# Patient Record
Sex: FEMALE | Race: BLACK OR AFRICAN AMERICAN | NOT HISPANIC OR LATINO | URBAN - METROPOLITAN AREA
[De-identification: names, ages, dates, MRNs, and addresses within clinical notes are randomized per-mention and may not be internally consistent; named-entity substitution may affect disease eponyms.]

---

## 2024-09-05 DIAGNOSIS — M47.892 OTHER SPONDYLOSIS, CERVICAL REGION: Primary | ICD-10-CM

## 2024-09-19 ENCOUNTER — CLINICAL SUPPORT (OUTPATIENT)
Dept: REHABILITATION | Facility: HOSPITAL | Age: 64
End: 2024-09-19
Payer: OTHER GOVERNMENT

## 2024-09-19 DIAGNOSIS — M47.892 OTHER SPONDYLOSIS, CERVICAL REGION: Primary | ICD-10-CM

## 2024-09-19 PROCEDURE — 97810 ACUP 1/> WO ESTIM 1ST 15 MIN: CPT | Mod: PN

## 2024-09-19 NOTE — PROGRESS NOTES
Acupuncture Evaluation Note     Name: Isa Hernandez  Clinic Number: [id number]    Traditional Chinese Medicine (TCM) Diagnosis: Qi Stagnation, Blood Stasis, and Qi Deficiency  Medical Diagnosis: [id number] Other spondylosis, cervical region    Evaluation Date: 9/19/2024    Visit #/Visits authorized: 1/ 8    ([id number], issue date 9/4/2024; [facility] 180 days; first visit 9/19/2024; expiration 3/18/2025)    Precautions: Standard    Subjective     Chief Concern: pain, 4 bulging discs in neck and back; headaches, pain whole body.    Neck pain onset over 20 years ago, progressively worsened. Onset due to a fall in 1984 in . . Restricted range of motion left and right. Pain at back of neck and occiput, worse pain on the left . Pain can be achey or sharp at times, constant, at a 7-8 even with pain medication    Mid to lower back pain. Can no longer run sor stand/walk for any length of time. Back will easily go into spasm.  Pain radiates down legs also, worse on the left. Radiates down lateral thigh.    Headaches, history of migraines for about 10 years, daily with nausea, at frontal, parietal or occipital, tightness in her eyes. Sleeps when pain is worse.    Knee pain - worse on left knee at base of knee front    Medical necessity is demonstrated by the following IMPAIRMENTS: Medical Necessity: Decreased mobility limits day to day activities, social, and emergent situations and Decreased quality of life                 Aggravating Factors:  movement   Relieving Factors:  nothing    Pain Description:     Quality:  Aching and Sharp  Severity:  7-10  Frequency:  continuously    Previous Treatments Tried:   medication, injections, therapy    HEENT:  CPAP    Chest:  not assessed    Digestion:     Taste/Thirst/Appetite: not assessed     Sleep:   difficult to sleep, takes naps    Energy Levels:  fatigue    Psychological Symptoms:  no support from family/friends; has a therapist just once a month    Other Symptoms:   none  discussed    GYN Symptoms:   not assessed    Objective     Observation: moves carefully    Tongue:  not assessed    Pulse:  not assessed    Treatment     Treatment Principles:  Regulate Qi & Blood; stop pain    Needle Set 1 -     Acupuncture points used:     Bilateral:  GB20, GB12, BL25, Derik Ari, BL32, BL40  Left:  adria lateral neck +2, GB21, BL37, GB34  Right:    Centerline:      Needles In: 17  Needles Out: 17  Needles W/O STIM placed:   10:15  Needles W/O STIM removed:   10:35      Other Traditional Chinese Medicine Modalities -  none    Assessment     After treatment, patient felt  no change    Patient prognosis is Fair.     Patient will continue to benefit from acupuncture treatment to address the deficits listed in the problem list box on initial evaluation, provide patient family education and to maximize pt's level of independence in the home and community environment.     Patient's spiritual, cultural and educational needs considered and pt agreeable to plan of care and goals.       Anticipated barriers to treatment:   none    Plan     Recommend 1 /week for 6 sessions then re-assess.      Recommendations:  none at this time    Education:  Patient is aware of cumulative effect of acupuncture

## 2024-10-03 ENCOUNTER — CLINICAL SUPPORT (OUTPATIENT)
Dept: REHABILITATION | Facility: HOSPITAL | Age: 64
End: 2024-10-03
Payer: OTHER GOVERNMENT

## 2024-10-03 DIAGNOSIS — M47.892 OTHER SPONDYLOSIS, CERVICAL REGION: Primary | ICD-10-CM

## 2024-10-03 PROCEDURE — 97810 ACUP 1/> WO ESTIM 1ST 15 MIN: CPT | Mod: PN

## 2024-10-03 PROCEDURE — 97811 ACUP 1/> W/O ESTIM EA ADD 15: CPT | Mod: PN

## 2024-10-03 NOTE — PROGRESS NOTES
Acupuncture Treatment Note     [id number]    Traditional Chinese Medicine Diagnosis: Qi Stagnation, Blood Stasis, and Qi Deficiency    Date of Service: 10/3/2024     Medical Diagnosis: [id number] Other spondylosis, cervical region     Evaluation Date: 9/19/2024    Visit #/Visits authorized: 2/ 8    ([id number], issue date 9/4/2024; [facility] 180 days; first visit 9/19/2024; expiration 3/18/2025)     Precautions: Standard     Subjective     Chief Concern:   chronic pain, 4 bulging discs in neck and back; headaches, pain whole body.     Medical necessity is demonstrated by the following IMPAIRMENTS: Medical Necessity: Decreased mobility limits day to day activities, social, and emergent situations and Decreased quality of life           Aggravating Factors:  movement   Relieving Factors:  nothing     Pain Description:    Quality:  Aching and Sharp  Severity:  7-10  Frequency:  continuously      Response to Previous Treatment:    Isa had reduction in pain for about 4 days after treatment.     Other Condition/Symptoms:    - Knee pain  - uses CPAP  - difficulty sleeping; fatigue    Objective      New Findings:    none    Pulse:    not assessed  Tongue:    not assessed    Treatment      Treatment Principles:    Regulate Qi & Blood; stop pain     Needle Set 1 -     Acupuncture Points:    Lie on right side -   Bilateral:  GB20, BL10,  BL25,  BL32, lateral border sacrum +2, BL23  Left:  adria lateral neck +2, GB21, SI13, SI14, BL57, GB34  Right:    Centerline:  GV line +4    #NEEDLES IN:   25  #NEEDLES OUT:   25  NEEDLES W/O STIM  placed:    10:20  NEEDLES W/O STIM removed:    10:55    Other Traditional Chinese Medicine Modalities:   none      Assessment      Analysis of Treatment:    After treatment Isa had pain at her right hip from lying on her right side on a table with only slight padding.    Pt prognosis is Fair.     Patient will continue to benefit from acupuncture treatment to  address the deficits listed in the problem list box on initial evaluation, provide patient family education and to maximize pt's level of independence in the home and community environment.     Patient's spiritual, cultural and educational needs considered and pt agreeable to plan of care and goals.     Anticipated barriers to treatment:   none    Plan     Recommend    continue with care plan.     Education:  Patient is aware of cumulative effect of acupuncture

## 2024-10-17 ENCOUNTER — CLINICAL SUPPORT (OUTPATIENT)
Dept: REHABILITATION | Facility: HOSPITAL | Age: 64
End: 2024-10-17
Payer: OTHER GOVERNMENT

## 2024-10-17 DIAGNOSIS — M47.892 OTHER SPONDYLOSIS, CERVICAL REGION: Primary | ICD-10-CM

## 2024-10-17 PROCEDURE — 97811 ACUP 1/> W/O ESTIM EA ADD 15: CPT | Mod: PN

## 2024-10-17 PROCEDURE — 97810 ACUP 1/> WO ESTIM 1ST 15 MIN: CPT | Mod: PN

## 2024-11-14 ENCOUNTER — CLINICAL SUPPORT (OUTPATIENT)
Dept: REHABILITATION | Facility: HOSPITAL | Age: 64
End: 2024-11-14
Payer: OTHER GOVERNMENT

## 2024-11-14 DIAGNOSIS — M47.892 OTHER SPONDYLOSIS, CERVICAL REGION: Primary | ICD-10-CM

## 2024-11-14 PROCEDURE — 97811 ACUP 1/> W/O ESTIM EA ADD 15: CPT | Mod: PN

## 2024-11-14 PROCEDURE — 97810 ACUP 1/> WO ESTIM 1ST 15 MIN: CPT | Mod: PN

## 2024-11-14 NOTE — PROGRESS NOTES
Acupuncture Treatment Note     Name: Isa Hernandez  [facility] Number: [id number]    Traditional Chinese Medicine Diagnosis: Qi Stagnation, Blood Stasis, and Qi Deficiency    Date of Service: 11/14/2024     Medical Diagnosis: [id number] Other spondylosis, cervical region     Evaluation Date: 9/19/2024    Visit #/Visits authorized: 4 / 8    ([id number], issue date 9/4/2024; [facility] 180 days; first visit 9/19/2024; expiration 3/18/2025)     Precautions: Standard     Subjective     Chief Concern:   chronic pain, 4 bulging discs in neck and back; headaches, pain whole body.     Medical necessity is demonstrated by the following IMPAIRMENTS: Medical Necessity: Decreased mobility limits day to day activities, social, and emergent situations and Decreased quality of life           Aggravating Factors:  movement   Relieving Factors:  nothing     Pain Description:    Quality:  Aching and Sharp  Severity:  7-10  Frequency:  continuously       Response to Previous Treatment:   Isa has not had a treatment in about a month. Today she has severe hip pain (both hips), migraines.  Right occipital area radiating to whole head  - waiting for appointment with neurologist to get appropriate migraine medication. Now constant pain, with nausea.      Other Condition/Symptoms:    - Knee pain  - uses CPAP  - difficulty sleeping; fatigue      Objective      New Findings:    none    Pulse:    not assessed  Tongue:    not assessed    Treatment      Treatment Principles:     Regulate Qi & Blood; stop pain     Needle Set 1 -     Acupuncture Points:      Sitting  Bilateral:  Ear BFA protocol +5; GB20, GB21, BL10, BL12, BL13  Centerline:  GV14    #NEEDLES IN:   21  #NEEDLES OUT:   21  NEEDLES W/O STIM  placed:    10:25  NEEDLES W/O STIM removed:    10:50    Other Traditional Chinese Medicine Modalities:   heat lamp      Assessment      Analysis of Treatment:    Isa felt relaxed from the treatment, and her pain was reduced somewhat (the edge was taken  off).    Pt prognosis is Fair.     Patient will continue to benefit from acupuncture treatment to address the deficits listed in the problem list box on initial evaluation, provide patient family education and to maximize pt's level of independence in the home and community environment.     Patient's spiritual, cultural and educational needs considered and pt agreeable to plan of care and goals.     Anticipated barriers to treatment:   none    Plan     Recommend    continue with care plan.     Education:  Patient is aware of cumulative effect of acupuncture

## 2024-12-05 ENCOUNTER — CLINICAL SUPPORT (OUTPATIENT)
Dept: REHABILITATION | Facility: HOSPITAL | Age: 64
End: 2024-12-05
Payer: OTHER GOVERNMENT

## 2024-12-05 DIAGNOSIS — M47.892 OTHER SPONDYLOSIS, CERVICAL REGION: Primary | ICD-10-CM

## 2024-12-05 PROCEDURE — 97810 ACUP 1/> WO ESTIM 1ST 15 MIN: CPT | Mod: PN

## 2024-12-05 NOTE — PROGRESS NOTES
Acupuncture Treatment Note     [facility] [id number]    Traditional Chinese Medicine Diagnosis: Qi Stagnation, Blood Stasis, and Qi Deficiency    Date of Service: 12/5/2024     Medical Diagnosis: [id number] Other spondylosis, cervical region     Evaluation Date: 9/19/2024    Visit #/Visits authorized: 5 / 8    ([id number], issue date 9/4/2024; [facility] 180 days; first visit 9/19/2024; expiration 3/18/2025)     Precautions: Standard     Subjective     Chief Concern:   chronic pain, 4 bulging discs in neck and back; headaches, pain whole body.     Medical necessity is demonstrated by the following IMPAIRMENTS: Medical Necessity: Decreased mobility limits day to day activities, social, and emergent situations and Decreased quality of life           Aggravating Factors:  movement   Relieving Factors:  nothing     Pain Description:    Quality:  Aching and Sharp  Severity:  7-10  Frequency:  continuously       Response to Previous Treatment:   Isa has not had a treatment in 3 weeks. She was in the hospital for high blood pressure. The last treatment helped with her migraine pain, but she reports that some of the ear areas  (from ear acupuncture) leaked fluid and were crusty for 4-5 days after the treatment. .Today she has low back pain and neck pain.      Other Condition/Symptoms:    - Knee pain  - uses CPAP  - difficulty sleeping; fatigue      Objective      New Findings:    none    Pulse:    not assessed  Tongue:    not assessed    Treatment      Treatment Principles:     Regulate Qi & Blood; stop pain     Needle Set 1 -     Acupuncture Points:      Sitting (lying face down too painful)  Bilateral:  GB20, GB21, BL11, BL12, SI13, BL23, BL25, Derik Ari  Centerline:  GV14    #NEEDLES IN:   17  #NEEDLES OUT:   17  NEEDLES W/O STIM  placed:    10:45  NEEDLES W/O STIM removed:    11:10    Other Traditional Chinese Medicine Modalities:   heat lamp      Assessment      Analysis of Treatment:    Isa was  sitting in a chair during treatment. She stood up and moved herself to lie face down on the treatment table reporting that she had lost sensation in her feet while sitting. I told her that I was not comfortable with her moving like that with the needles in. After the treatment, Isa said that her skin was itchy where the needles had been located. We both agreed that she was not tolerating the acupuncture well. It is difficult for her to feel comfortable in any position - lying down or sitting. She has sensitivity reactions to the needles at times. Isa will consider whether she wants to return. If she does return, we will discuss again whether she can receive the acupuncture.     Pt prognosis is Guarded.     Patient will continue to benefit from acupuncture treatment to address the deficits listed in the problem list box on initial evaluation, provide patient family education and to maximize pt's level of independence in the home and community environment.     Patient's spiritual, cultural and educational needs considered and pt agreeable to plan of care and goals.     Anticipated barriers to treatment:   none    Plan     Recommend    continue with care plan.     Education:  Patient is aware of cumulative effect of acupuncture